# Patient Record
(demographics unavailable — no encounter records)

---

## 2022-02-16 NOTE — EMERGENCY DEPARTMENT REPORT
ED Psych HPI





- General


Chief Complaint: Psych


Stated Complaint: ANXIETY


Time Seen by Provider: 22 14:34


Source: patient


Mode of arrival: Ambulatory





- History of Present Illness


Initial Comments: 





Patient is 19 years old female with history of bipolar disorder.  Patient prese

nted to the ER stated that she had suicidal ideation 2 days ago and she is 

worried that her medication is not working well for her and she stated that she 

is experiencing side effects from her medication.  Patient stated that she 

thought about overdosing on her medication 2 days ago and also thought about 

stabbing herself with a knife.  Patient currently denying any suicidal ideation.

 She also denies any homicidal ideation.  No visual or auditory hallucination.


MD Complaint: suicidal ideation





- Related Data


                                  Previous Rx's











 Medication  Instructions  Recorded  Last Taken  Type


 


Escitalopram Oxalate [Lexapro] 5 mg PO QDAY 30 Days #30 tablet 21 Unknown 

Rx


 


QUEtiapine [SEROquel] 25 mg PO BID 30 Days #60 tablet 21 Unknown Rx











                                    Allergies











Allergy/AdvReac Type Severity Reaction Status Date / Time


 


No Known Allergies Allergy   Verified 21 10:50














ED Review of Systems


ROS: 


Stated complaint: ANXIETY


Other details as noted in HPI





Comment: All other systems reviewed and negative


Constitutional: denies: chills, fever


Respiratory: denies: cough, shortness of breath, SOB with exertion


Cardiovascular: denies: chest pain, palpitations


Gastrointestinal: denies: abdominal pain, nausea, vomiting, diarrhea, 

constipation, hematemesis, hematochezia


Musculoskeletal: denies: back pain


Neurological: denies: headache, weakness, numbness, paresthesias, confusion





ED Past Medical Hx





- Past Medical History


Hx Seizures: Yes


Hx Psychiatric Treatment:  (Bipolar, schizophrenia)





- Social History


Smoking Status: Never Smoker


Substance Use Type: None (Denies illicit drug use)





- Medications


Home Medications: 


                                Home Medications











 Medication  Instructions  Recorded  Confirmed  Last Taken  Type


 


Escitalopram Oxalate [Lexapro] 5 mg PO QDAY 30 Days #30 tablet 21  Unknown

 Rx


 


QUEtiapine [SEROquel] 25 mg PO BID 30 Days #60 tablet 21  Unknown Rx














ED Physical Exam





- General


Limitations: No Limitations


General appearance: alert, in no apparent distress





- Head


Head exam: Present: atraumatic, normocephalic, normal inspection





- Eye


Eye exam: Present: normal appearance, PERRL





- ENT


ENT exam: Present: normal exam, normal orophraynx, mucous membranes moist





- Neck


Neck exam: Present: normal inspection, full ROM.  Absent: tenderness, 

meningismus





- Respiratory


Respiratory exam: Present: normal lung sounds bilaterally





- Cardiovascular


Cardiovascular Exam: Present: regular rate, normal rhythm, normal heart sounds





- GI/Abdominal


GI/Abdominal exam: Present: soft, normal bowel sounds.  Absent: distended, tend

erness, guarding, rebound, rigid, organomegaly, mass, bruit, pulsatile mass, 

hernia





- Extremities Exam


Extremities exam: Present: normal inspection, full ROM, normal capillary refill.

  Absent: tenderness





- Back Exam


Back exam: Present: normal inspection, full ROM.  Absent: CVA tenderness (R), 

CVA tenderness (L)





- Neurological Exam


Neurological exam: Present: alert, oriented X3, CN II-XII intact, normal gait, 

reflexes normal





- Psychiatric


Psychiatric exam: Present: anxious.  Absent: homicidal ideation, suicidal 

ideation





- Skin


Skin exam: Present: warm, intact, normal color





ED Course


                                   Vital Signs











  22





  13:44 18:24


 


Temperature 98.4 F 


 


Pulse Rate 79 


 


Respiratory 16 





Rate  


 


Blood Pressure 131/81 





[Right]  


 


O2 Sat by Pulse 99 96





Oximetry  














ED Medical Decision Making





- Lab Data


Result diagrams: 


                                 22 14:25





                                 22 14:25





- Medical Decision Making





Patient is 19 years old female with history of bipolar disorder.  Patient pre

sented to the ER stated that she had suicidal ideation 2 days ago and she is 

worried that her medication is not working well for her and she stated that she 

is experiencing side effects from her medication.  Patient stated that she 

thought about overdosing on her medication 2 days ago and also thought about 

stabbing herself with a knife.  Patient currently denying any suicidal ideation.

  She also denies any homicidal ideation.  No visual or auditory hallucination.





Labs reviewed and is unremarkable.  Patient is medically cleared to be evaluated

 by psychiatric team.





Patient has been evaluated by our psychiatric team and recommended to discharge 

patient to follow-up as an outpatient.  Patient contracted for safety.  Patient 

advised to follow-up with her primary doctor in the next 2 to 3 days and to 

return to the ER if he develop any new symptoms.





Critical care attestation.: 


If time is entered above; I have spent that time in minutes in the direct care 

of this critically ill patient, excluding procedure time.








ED Disposition


Clinical Impression: 


 Suicidal ideation





Disposition:  HOME / SELF CARE / HOMELESS


Is pt being admited?: No


Condition: Stable


Instructions:  Suicidal Feelings: How to Help Yourself


Additional Instructions: 


Professional and Agency Contacts To help Resolve Crises()


[place] Crisis Line: 1-562.608.9099


Suicide Prevention Line: 1-935.706.4197


Crisis Text Line: Text START to 199089


Emergency: 911





Outpatient COMMUNITY Behavioral Health Resources:





[facility] 


[address]


[place] [address]





[place]: [facility]


[address], [place] [address] 


Phone: [phone]





[place]:


[facility]


[address] [place] [address]


Phone: [phone]





[place]:


[facility] - [address] [place] [address]


Phone: [phone]


Monday thru Friday - 8am - 5pm





[facility]


Address: [address], [place] [address]


Phone: [phone]





[place]:


[facility]


Address: [address] [place] [address]


Monday thru Friday- 7am-2pm


Phone: [phone]





[facility]


Address: [address] [place] [address]


Monday thru Friday: 8:30AM-5PM


Phone: [phone]








OUTPATIENT MENTAL HEALTH RESOURCES





[facility],


[address] [place] [address]


[phone]





[facility] Wesley Bates MD:


[address]


[place], [place] [id number] [phone]





[facility]:


[address], [place] [id number] [phone]





[facility] [place] [facility] [place], [place] [address] [phone]





[facility]:


[address]


[place], [place] [address] [phone]





[facility]:


 [address]


 [facility] [address]





 [phone]





[facility]:


[address] [place] [address]


[phone]





Ignacio Ba MD:


  [facility] [address]


 [phone]





[facility]:


[address] [place] [id number] [phone]





**[place] CRISIS AND ACCESS LINE: [phone]*


Referrals: 


MICAELA HAMILTON MD [Other] - 3-5 Days